# Patient Record
(demographics unavailable — no encounter records)

---

## 2024-11-05 NOTE — HISTORY OF PRESENT ILLNESS
[de-identified] : Mr. MAST is a pleasant 65 year old male who presents today with a chief complaint of LEFT ear pulsatile tinnitus.  It first started > 20 years ago.  Since that time it has been getting progressively worse.  It is described as a constant, high-pitched ringing sound that is in sync with his pulse.  Pressing on the area in front of his tragus and pushing his lower jaw forward can make the sound louder.  There is no body position or maneuver that improves the sound, including neck or occipital artery area pressure.    He has been seen by ENT in the past and was not noted to have significant hearing loss.  He had a left eustachian tuboplasty without improvement in the sound.  He does report some noise sensitivity.    He had a normal cardiac evaluation.

## 2024-11-05 NOTE — ASSESSMENT
[FreeTextEntry1] : Impression: LEFT Pulsatile Tinnitus No change with ipsilateral neck pressure  We discussed possible causes of pulsatile tinnitus including: ENT causes such as semicircular canal dehiscence, tumor, ototoxicity Cardiac causes such as aortic stenosis, valve disease, HTN, other causes of heart murmur Anemia Thyroid disease Cerebrovascular causes such as arteriovenous malformation (AVM), dural arteriovenous fistula (dAVF), venous sinus stenosis, venous aneurysm, large trans-mastoid emissary vein, carotid stenosis, jugular bulb diverticulum   MRI/MRA 2024 reveals mild bilateral venous sinus stenosis; no dural AVF or AVM  I reviewed the imaging with Mr. Rush in detail.  While he does have findings that are known to cause pulsatile tinnitus, it does not correlate with the sound that he describes.  I would expect the sound to be a whooshing sound that resolves with ipsilateral neck pressure, which his does not.  Based on the quality of the sound he describes and the imaging review, there is no cerebrovascular cause of his pulsatile tinnitus.  I do not recommend any invasive testing such as catheter angiogram, as this would be very low yield.    Recommendations: Continue with Coping Mechanisms Follow Up with Me As Needed

## 2024-11-05 NOTE — REASON FOR VISIT
[Home] : at home, [unfilled] , at the time of the educational consult. [Medical Office: ([facility])___] : at the medical office located in  [Participant] : the participant [Self] : self [New Patient Visit] : a new patient visit [FreeTextEntry1] : Pulsatile Tinnitus